# Patient Record
Sex: FEMALE | Race: WHITE
[De-identification: names, ages, dates, MRNs, and addresses within clinical notes are randomized per-mention and may not be internally consistent; named-entity substitution may affect disease eponyms.]

---

## 2018-04-12 ENCOUNTER — HOSPITAL ENCOUNTER (EMERGENCY)
Dept: HOSPITAL 45 - C.EDB | Age: 53
LOS: 1 days | Discharge: HOME | End: 2018-04-13
Payer: COMMERCIAL

## 2018-04-12 VITALS — TEMPERATURE: 100.94 F

## 2018-04-12 VITALS
BODY MASS INDEX: 32.84 KG/M2 | WEIGHT: 197.09 LBS | WEIGHT: 197.09 LBS | HEIGHT: 65 IN | BODY MASS INDEX: 32.84 KG/M2 | HEIGHT: 65 IN

## 2018-04-12 DIAGNOSIS — R11.2: Primary | ICD-10-CM

## 2018-04-12 DIAGNOSIS — Z88.5: ICD-10-CM

## 2018-04-12 DIAGNOSIS — R19.7: ICD-10-CM

## 2018-04-12 DIAGNOSIS — Z87.01: ICD-10-CM

## 2018-04-12 DIAGNOSIS — R07.89: ICD-10-CM

## 2018-04-13 VITALS — OXYGEN SATURATION: 95 % | HEART RATE: 102 BPM | DIASTOLIC BLOOD PRESSURE: 81 MMHG | SYSTOLIC BLOOD PRESSURE: 135 MMHG

## 2018-04-13 LAB
ALBUMIN SERPL-MCNC: 3.7 GM/DL (ref 3.4–5)
ALP SERPL-CCNC: 116 U/L (ref 45–117)
ALT SERPL-CCNC: 47 U/L (ref 12–78)
AST SERPL-CCNC: 36 U/L (ref 15–37)
BASOPHILS # BLD: 0.01 K/UL (ref 0–0.2)
BASOPHILS NFR BLD: 0.1 %
BUN SERPL-MCNC: 21 MG/DL (ref 7–18)
CALCIUM SERPL-MCNC: 8.4 MG/DL (ref 8.5–10.1)
CO2 SERPL-SCNC: 22 MMOL/L (ref 21–32)
CREAT SERPL-MCNC: 0.93 MG/DL (ref 0.6–1.2)
EOS ABS #: 0.1 K/UL (ref 0–0.5)
EOSINOPHIL NFR BLD AUTO: 173 K/UL (ref 130–400)
GLUCOSE SERPL-MCNC: 109 MG/DL (ref 70–99)
HCT VFR BLD CALC: 40.2 % (ref 37–47)
HGB BLD-MCNC: 14.2 G/DL (ref 12–16)
IG#: 0.02 K/UL (ref 0–0.02)
IMM GRANULOCYTES NFR BLD AUTO: 4.3 %
LIPASE: 193 U/L (ref 73–393)
LYMPHOCYTES # BLD: 0.36 K/UL (ref 1.2–3.4)
MCH RBC QN AUTO: 30.9 PG (ref 25–34)
MCHC RBC AUTO-ENTMCNC: 35.3 G/DL (ref 32–36)
MCV RBC AUTO: 87.6 FL (ref 80–100)
MONO ABS #: 0.45 K/UL (ref 0.11–0.59)
MONOCYTES NFR BLD: 5.3 %
NEUT ABS #: 7.53 K/UL (ref 1.4–6.5)
NEUTROPHILS # BLD AUTO: 1.2 %
NEUTROPHILS NFR BLD AUTO: 88.9 %
PMV BLD AUTO: 9.5 FL (ref 7.4–10.4)
POTASSIUM SERPL-SCNC: 3.7 MMOL/L (ref 3.5–5.1)
PROT SERPL-MCNC: 7.5 GM/DL (ref 6.4–8.2)
RED CELL DISTRIBUTION WIDTH CV: 13.6 % (ref 11.5–14.5)
RED CELL DISTRIBUTION WIDTH SD: 43.4 FL (ref 36.4–46.3)
SODIUM SERPL-SCNC: 137 MMOL/L (ref 136–145)
WBC # BLD AUTO: 8.47 K/UL (ref 4.8–10.8)

## 2018-04-13 NOTE — DIAGNOSTIC IMAGING REPORT
PA CHEST WITH ABDOMINAL SERIES



CLINICAL HISTORY:  Vomiting.



FINDINGS:



A PA chest radiograph is obtained. No prior studies are available for comparison

at the time of dictation.  The cardiomediastinal silhouette is unremarkable. The

lungs and pleural spaces are clear. No pneumothorax is seen. The bony thorax is

grossly intact.



Supine and erect abdominal radiographs are obtained. No prior studies are

available for comparison at the time of dictation. Cholecystectomy clips are

noted. There is a nonobstructed abdominal bowel gas pattern. Fluid-filled loops

of bowel are noted. No evidence of intraperitoneal free air is seen. There are

no abnormal abdominal calcifications. The lumbosacral spine and bony pelvis

appear intact.



IMPRESSION: 



1. No active disease in the chest.



2. There is no evidence of bowel obstruction.



3. There are fluid-filled loops of bowel. Correlate clinically for evidence of a

nonspecific enteritis.







Electronically signed by:  Jose Ortiz M.D.

4/13/2018 7:16 AM



Dictated Date/Time:  4/13/2018 7:15 AM

## 2018-04-13 NOTE — DIAGNOSTIC IMAGING REPORT
CT ANGIOGRAPHY OF THE CHEST, PULMONARY EMBOLUS PROTOCOL



CLINICAL HISTORY: Vomiting.    



COMPARISON STUDY:  Chest radiograph April 13, 2018. 



TECHNIQUE: Following IV administration of 93 mL of Optiray-320, helical axial

images of the chest were obtained utilizing the pulmonary embolus protocol. 

Maximal intensity projections and sagittal and coronal reformats were viewed on

an independent 3D workstation.  IV contrast was administered without

complication.  A dose lowering technique was utilized adhering to the principles

of ALARA.





CT DOSE: 371.65 mGy.cm



FINDINGS:  No pulmonary emboli are identified although this exam is mildly

compromised by respiratory motion artifact. The size of the heart is at the

upper limits of normal. There is no pericardial effusion. No thoracic aortic

dissection is noted. No consolidation is present. Central airways are patent.

There is no thoracic lymphadenopathy. No pneumothorax or pleural effusion is

noted. Bony thorax is unremarkable. Gallbladder is surgically absent.



IMPRESSION:  



1. No pulmonary emboli identified although exam mildly compromised by

respiratory motion artifact.



2. No acute intrathoracic findings.











Electronically signed by:  Billy Baptiste M.D.

4/13/2018 7:13 AM



Dictated Date/Time:  4/13/2018 7:08 AM